# Patient Record
Sex: FEMALE | Race: WHITE | URBAN - METROPOLITAN AREA
[De-identification: names, ages, dates, MRNs, and addresses within clinical notes are randomized per-mention and may not be internally consistent; named-entity substitution may affect disease eponyms.]

---

## 2019-10-11 ENCOUNTER — TELEPHONE (OUTPATIENT)
Dept: OTHER | Age: 39
End: 2019-10-11

## 2019-10-11 NOTE — TELEPHONE ENCOUNTER
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

## (undated) NOTE — LETTER
10/11/2019              Ema Carreno        [address], [address] [facility]. [id number]         To Whom It May Concern,                          <=\"\">  Immunizations                FLU VACC 4 MIGEL Split Virus 3 YR+ ([id number]) 10/14/2010

## (undated) NOTE — LETTER
10/11/2019              Claudean Rick [place].                                                                                 Norma Delarosa                                                                                [id number]